# Patient Record
Sex: MALE | Race: WHITE | NOT HISPANIC OR LATINO | Employment: OTHER | URBAN - METROPOLITAN AREA
[De-identification: names, ages, dates, MRNs, and addresses within clinical notes are randomized per-mention and may not be internally consistent; named-entity substitution may affect disease eponyms.]

---

## 2024-09-28 ENCOUNTER — HOSPITAL ENCOUNTER (OUTPATIENT)
Dept: RADIOLOGY | Facility: HOSPITAL | Age: 69
Discharge: HOME | End: 2024-09-28
Payer: MEDICARE

## 2024-09-28 DIAGNOSIS — Z13.6 ENCOUNTER FOR SCREENING FOR CARDIOVASCULAR DISORDERS: ICD-10-CM

## 2024-09-28 DIAGNOSIS — E78.2 MIXED HYPERLIPIDEMIA: ICD-10-CM

## 2024-09-28 PROCEDURE — 75571 CT HRT W/O DYE W/CA TEST: CPT

## 2025-02-10 ENCOUNTER — HOSPITAL ENCOUNTER (EMERGENCY)
Facility: HOSPITAL | Age: 70
Discharge: HOME | End: 2025-02-10
Payer: MEDICARE

## 2025-02-10 ENCOUNTER — APPOINTMENT (OUTPATIENT)
Dept: RADIOLOGY | Facility: HOSPITAL | Age: 70
End: 2025-02-10
Payer: MEDICARE

## 2025-02-10 VITALS
HEART RATE: 71 BPM | HEIGHT: 68 IN | WEIGHT: 143 LBS | OXYGEN SATURATION: 100 % | TEMPERATURE: 97.5 F | SYSTOLIC BLOOD PRESSURE: 145 MMHG | BODY MASS INDEX: 21.67 KG/M2 | RESPIRATION RATE: 14 BRPM | DIASTOLIC BLOOD PRESSURE: 79 MMHG
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
## 2025-02-10 DIAGNOSIS — S01.01XA LACERATION OF SCALP, INITIAL ENCOUNTER: ICD-10-CM

## 2025-02-10 DIAGNOSIS — S09.90XA HEAD INJURY, INITIAL ENCOUNTER: ICD-10-CM

## 2025-02-10 PROCEDURE — 70450 CT HEAD/BRAIN W/O DYE: CPT

## 2025-02-10 PROCEDURE — 72125 CT NECK SPINE W/O DYE: CPT | Performed by: RADIOLOGY

## 2025-02-10 PROCEDURE — 12001 RPR S/N/AX/GEN/TRNK 2.5CM/<: CPT

## 2025-02-10 PROCEDURE — 72125 CT NECK SPINE W/O DYE: CPT

## 2025-02-10 PROCEDURE — 99284 EMERGENCY DEPT VISIT MOD MDM: CPT | Mod: 25

## 2025-02-10 RX ORDER — BACITRACIN ZINC 500 UNIT/G
OINTMENT IN PACKET (EA) TOPICAL ONCE
Status: DISCONTINUED | OUTPATIENT
Start: 2025-02-10 | End: 2025-02-10 | Stop reason: HOSPADM

## 2025-02-10 ASSESSMENT — COLUMBIA-SUICIDE SEVERITY RATING SCALE - C-SSRS
6. HAVE YOU EVER DONE ANYTHING, STARTED TO DO ANYTHING, OR PREPARED TO DO ANYTHING TO END YOUR LIFE?: NO
2. HAVE YOU ACTUALLY HAD ANY THOUGHTS OF KILLING YOURSELF?: NO
1. IN THE PAST MONTH, HAVE YOU WISHED YOU WERE DEAD OR WISHED YOU COULD GO TO SLEEP AND NOT WAKE UP?: NO

## 2025-02-10 ASSESSMENT — PAIN DESCRIPTION - LOCATION: LOCATION: HEAD

## 2025-02-10 ASSESSMENT — PAIN - FUNCTIONAL ASSESSMENT: PAIN_FUNCTIONAL_ASSESSMENT: 0-10

## 2025-02-10 ASSESSMENT — PAIN DESCRIPTION - PAIN TYPE: TYPE: ACUTE PAIN

## 2025-02-10 NOTE — ED PROCEDURE NOTE
Procedure  Laceration Repair    Performed by: BRIELLE Shi  Authorized by: BRIELLE Shi    Consent:     Consent obtained:  Verbal    Consent given by:  Patient    Risks, benefits, and alternatives were discussed: yes      Risks discussed:  Infection, need for additional repair, pain and poor wound healing    Alternatives discussed:  No treatment  Universal protocol:     Imaging studies available: yes      Patient identity confirmed:  Verbally with patient  Anesthesia:     Anesthesia method:  None  Laceration details:     Location:  Scalp    Scalp location:  Occipital    Length (cm):  2  Pre-procedure details:     Preparation:  Patient was prepped and draped in usual sterile fashion and imaging obtained to evaluate for foreign bodies  Exploration:     Limited defect created (wound extended): no      Hemostasis achieved with:  Direct pressure    Wound exploration: entire depth of wound visualized      Contaminated: no    Treatment:     Area cleansed with:  Povidone-iodine (Washed with Hibiclens and saline)    Amount of cleaning:  Extensive    Irrigation solution:  Sterile water    Irrigation method:  Syringe    Visualized foreign bodies/material removed: no      Debridement:  None    Undermining:  None  Skin repair:     Repair method:  Staples    Number of staples:  4  Approximation:     Approximation:  Close  Repair type:     Repair type:  Intermediate  Post-procedure details:     Dressing:  Open (no dressing)    Procedure completion:  Tolerated well, no immediate complications               BRIELLE Shi  02/10/25 [id number]

## 2025-02-10 NOTE — ED PROVIDER NOTES
Limitations to History: None     HPI:      Remington Cain is a 69 y.o. with significant PMH for HLD and up-to-date tetanus status presenting to ED today from home with wife for evaluation after an accidental fall.  Just prior to arrival, the patient slipped on ice in front of his garage, he fell back and struck the occiput of the head on cement, no LOC or use of blood thinners.  No neck or back pain.  No prodrome prior to the fall.  Sustained a small abrasion/laceration to the occiput of the head, hemostasis maintained.  Denies fever/chills, cough/cold symptoms, chest pain, shortness of breath, nausea/vomiting, abdominal pain, urinary symptoms, change in bowel habits or any other complaints.  No smoking, EtOH or IV drugs.  PCP at [facility].    Additional History Obtained from: Triage/nursing notes reviewed.    ------------------------------------------------------------------------------------------------------------------------------------------    VS: As documented in the triage note and EMR flowsheet from this visit were reviewed.    Physical Exam:  Gen: Pleasant 69-year-old male, awake and alert, oriented x 3.  Well-nourished and hydrated.  Nontoxic looking.  Head/Neck: NCAT, neck w/ FROM.  No C-spine midline tenderness, no step-off.  Eyes: EOMI, PERRL, anicteric sclerae, noninjected conjunctivae  Ears: TMs clear b/l without sign of infection  Nose: Nares patent w/o rhinorrhea  Mouth:  MMM, no OP lesions noted  Heart: RRR no MRG  Lungs: CTA b/l no RRW, no increased work of breathing  Abdomen: soft, NT, ND, no HSM, no palpable masses  Musculoskeletal: No midline T or L-spine tenderness, no step-off.  Normal gait.  MIKEL's x 4.  MSPs intact.  Skin intact.  No deformities.  Neurologic: Alert, symmetrical facies, phonates clearly, moves all extremities equally, responsive to touch, ambulates normally   Skin: T shaped laceration on the occiput of the head, edges of the wound slightly gaping approximately 2 cm in length.   Pink, warm dry.  No erythema or ecchymosis.  No rashes noted  Psychological: calm, no SI/HI      ------------------------------------------------------------------------------------------------------------------------------------------    Medical Decision Makin-year-old male with history of HLD with up-to-date Tdap status is evaluated the bedside after mechanical fall on ice.  The patient fell backwards and struck his head on the cement in his garage and sustained a 2 cm T shaped laceration to the occiput of the head.  No LOC or use of blood thinners.  No neck or back pain.  Vital signs within normal limits.  Afebrile.  Hemostasis maintained.  Neuro intact.  No midline spinal tenderness.  Procedure note for wound closure.    ED Course as of 02/10/25 1353   Mon Feb 10, 2025   1206 Imaging was reviewed.  CT C-spine shows no acute fracture/subluxations, degenerative changes are noted.  CT head shows no acute intercranial process. [SB]   1352 Tolerated closure well, bacitracin applied.  Repeat vital signs within normal limits.  Remains neuro intact.  Discharge home.  Follow-up with PCP or ER in the next 5 to 7 days for staple removal.  Wound care discussed.  Head injury precautions discussed.  Tylenol for pain.  Red flags and return precautions discussed.  All questions were entertained and answered.  Shared decision making conducted.  Patient verbalizes understanding of diagnosis and treatment plan.  Condition stable for discharge. [SB]      ED Course User Index  [SB] Kirstie Kaur, APRN-CNP         Diagnoses as of 02/10/25 1353   Fall, initial encounter   Head injury, initial encounter   Laceration of scalp, initial encounter       EKG interpreted by myself (ED attending physician): Not ordered    Chronic Medical Conditions Significantly Affecting Care: None    External Records Reviewed: I reviewed recent and relevant outside records including: None    Discussion of Management with Other Providers: None    I  discussed the patient/results with: None     Kirstie Kaur, PERRY-CNP  02/10/25 [id number]

## 2025-02-10 NOTE — DISCHARGE INSTRUCTIONS
4 staples were placed in laceration of the back of the scalp, it may continue to ooze for a day or 2.  Apply OTC antibiotic ointment twice a day.  Wash with mild soap.  May wash hair tomorrow.  Return to the emergency room or PCP in the next 5 to 7 days for staple removal.  May use Tylenol for pain.  Watch for signs of infection including redness, purulent drainage at the development of a fever.  If you develop severe headache, ataxia, double vision, intractable nausea/vomiting or confusion return to the emergency room as these are signs of worsening head injury that need evaluation.

## 2025-02-10 NOTE — ED TRIAGE NOTES
Pt was outside when he fell on ice. Hit the back of his head. Has a small abrasion that was bleeding prior to arrival. Also complaining of jaw issue on the left side. States that it feels like it pops when he opens it. Did not have that prior to fall. No LOC, nausea or vomiting. Not on blood thinners.